# Patient Record
Sex: MALE | Race: WHITE | NOT HISPANIC OR LATINO | URBAN - METROPOLITAN AREA
[De-identification: names, ages, dates, MRNs, and addresses within clinical notes are randomized per-mention and may not be internally consistent; named-entity substitution may affect disease eponyms.]

---

## 2023-10-20 ENCOUNTER — OFFICE VISIT (OUTPATIENT)
Dept: FAMILY MEDICINE CLINIC | Facility: CLINIC | Age: 48
End: 2023-10-20

## 2023-10-20 VITALS
TEMPERATURE: 98.1 F | DIASTOLIC BLOOD PRESSURE: 80 MMHG | WEIGHT: 305.8 LBS | SYSTOLIC BLOOD PRESSURE: 138 MMHG | HEART RATE: 72 BPM | OXYGEN SATURATION: 99 % | HEIGHT: 72 IN | BODY MASS INDEX: 41.42 KG/M2

## 2023-10-20 DIAGNOSIS — Z13.220 LIPID SCREENING: ICD-10-CM

## 2023-10-20 DIAGNOSIS — Z76.89 ENCOUNTER TO ESTABLISH CARE: Primary | ICD-10-CM

## 2023-10-20 DIAGNOSIS — Z23 ENCOUNTER FOR IMMUNIZATION: ICD-10-CM

## 2023-10-20 DIAGNOSIS — Z13.228 SCREENING FOR METABOLIC DISORDER: ICD-10-CM

## 2023-10-20 DIAGNOSIS — Z12.11 COLON CANCER SCREENING: ICD-10-CM

## 2023-10-20 NOTE — PATIENT INSTRUCTIONS
Fasting labs as ordered  Cologuard as ordered  Schedule appointment for annual physical after labs completed

## 2023-10-20 NOTE — PROGRESS NOTES
[facility]        NAME: Shirley Webber is a 50 y.o. male  : 1975    MRN: [id number]  DATE: 2023  TIME: 3:23 PM    Assessment and Plan   Encounter to establish care [Z76.89]  1. Encounter to establish care        2. Lipid screening  Lipid Panel with Direct LDL reflex    Lipid Panel with Direct LDL reflex      3. Screening for metabolic disorder  Comprehensive metabolic panel    Comprehensive metabolic panel      4. Colon cancer screening  Cologuard      5. Encounter for immunization  TDAP VACCINE GREATER THAN OR EQUAL TO 8YO IM            Patient Instructions     Patient Instructions   Fasting labs as ordered  Cologuard as ordered  Schedule appointment for annual physical after labs completed        Chief Complaint     Chief Complaint   Patient presents with    Establish Care         History of Present Illness       Establish care as a new patient no other concerns today. Needs labs and cologuard will schedule for an PE  HX of gastric bypass  Wants dtap, declines flu           Review of Systems   Review of Systems   Constitutional:  Negative for activity change, diaphoresis, fatigue and fever. HENT:  Negative for congestion, facial swelling, hearing loss, rhinorrhea, sinus pressure, sinus pain, sneezing, sore throat and voice change. Eyes:  Negative for discharge and visual disturbance. Respiratory:  Negative for cough, choking, chest tightness, shortness of breath, wheezing and stridor. Cardiovascular:  Negative for chest pain, palpitations and leg swelling. Gastrointestinal:  Negative for abdominal distention, abdominal pain, constipation, diarrhea, nausea and vomiting. Endocrine: Negative for polydipsia, polyphagia and polyuria. Genitourinary:  Negative for difficulty urinating, dysuria, frequency and urgency. Musculoskeletal:  Negative for arthralgias, back pain, gait problem, joint swelling, myalgias, neck pain and neck stiffness.    Skin: Negative for color change, rash and wound. Neurological:  Negative for dizziness, syncope, speech difficulty, weakness, light-headedness and headaches. Hematological:  Negative for adenopathy. Does not bruise/bleed easily. Psychiatric/Behavioral:  Negative for agitation, behavioral problems, confusion, hallucinations, sleep disturbance and suicidal ideas. The patient is not nervous/anxious. Current Medications     No current outpatient medications on file. Current Allergies     Allergies as of 10/20/2023    (No Known Allergies)            The following portions of the patient's history were reviewed and updated as appropriate: allergies, current medications, past family history, past medical history, past social history, past surgical history and problem list.     History reviewed. No pertinent past medical history. History reviewed. No pertinent surgical history. Family History   Problem Relation Age of Onset    Coronary artery disease Mother     Arthritis Mother     Diabetes Maternal Grandmother     Glaucoma Maternal Grandmother          Medications have been verified. Objective   /80 (BP Location: Left arm, Patient Position: Sitting, Cuff Size: Standard)   Pulse 72   Temp 98.1 °F (36.7 °C) (Tympanic)   Ht 6' (1.829 m)   Wt (!) 139 kg (305 lb 12.8 oz)   SpO2 99%   BMI 41.47 kg/m²        Physical Exam     Physical Exam  Vitals and nursing note reviewed. Constitutional:       General: He is not in acute distress. Appearance: Normal appearance. He is well-developed. He is obese. He is not ill-appearing. HENT:      Head: Normocephalic and atraumatic. Right Ear: Tympanic membrane, ear canal and external ear normal. There is no impacted cerumen. Left Ear: Tympanic membrane, ear canal and external ear normal. There is no impacted cerumen. Nose: Nose normal.      Right Sinus: No maxillary sinus tenderness or frontal sinus tenderness.       Left Sinus: No maxillary sinus tenderness or frontal sinus tenderness. Mouth/Throat:      Mouth: Mucous membranes are moist.      Pharynx: Uvula midline. No oropharyngeal exudate or posterior oropharyngeal erythema. Eyes:      General:         Right eye: No discharge. Left eye: No discharge. Conjunctiva/sclera: Conjunctivae normal.      Pupils: Pupils are equal, round, and reactive to light. Neck:      Thyroid: No thyromegaly. Trachea: No tracheal deviation. Cardiovascular:      Rate and Rhythm: Normal rate and regular rhythm. Heart sounds: Normal heart sounds. No murmur heard. No friction rub. No gallop. Pulmonary:      Effort: Pulmonary effort is normal. No respiratory distress. Breath sounds: Normal breath sounds. No stridor. No wheezing, rhonchi or rales. Chest:      Chest wall: No tenderness. Abdominal:      General: Bowel sounds are normal. There is no distension. Palpations: Abdomen is soft. There is no mass. Tenderness: There is no abdominal tenderness. There is no guarding or rebound. Musculoskeletal:         General: No tenderness or deformity. Normal range of motion. Cervical back: Normal range of motion and neck supple. Right lower leg: No edema. Left lower leg: No edema. Lymphadenopathy:      Cervical: No cervical adenopathy. Skin:     General: Skin is warm and dry. Capillary Refill: Capillary refill takes less than 2 seconds. Coloration: Skin is not jaundiced. Findings: No bruising, erythema, lesion or rash. Neurological:      Mental Status: He is alert and oriented to person, place, and time. Cranial Nerves: No cranial nerve deficit. Motor: No weakness. Coordination: Coordination normal.      Gait: Gait normal.   Psychiatric:         Mood and Affect: Mood normal.         Speech: Speech normal.         Behavior: Behavior normal.         Thought Content:  Thought content normal.         Judgment: Judgment normal.       PHQ-2/9 Depression Screening    Little interest or pleasure in doing things: 0 - not at all  Feeling down, depressed, or hopeless: 0 - not at all  Trouble falling or staying asleep, or sleeping too much: 0 - not at all  Feeling tired or having little energy: 0 - not at all  Poor appetite or overeatin - not at all  Feeling bad about yourself - or that you are a failure or have let yourself or your family down: 0 - not at all  Trouble concentrating on things, such as reading the newspaper or watching television: 0 - not at all  Moving or speaking so slowly that other people could have noticed.  Or the opposite - being so fidgety or restless that you have been moving around a lot more than usual: 0 - not at all  Thoughts that you would be better off dead, or of hurting yourself in some way: 0 - not at all  PHQ-2 Score: 0  PHQ-2 Interpretation: Negative depression screen  PHQ-9 Score: 0   PHQ-9 Interpretation: No or Minimal depression

## 2023-11-15 LAB — COLOGUARD RESULT REPORTABLE: NEGATIVE

## 2023-11-17 ENCOUNTER — TELEPHONE (OUTPATIENT)
Dept: FAMILY MEDICINE CLINIC | Facility: CLINIC | Age: 48
End: 2023-11-17

## 2023-11-17 NOTE — TELEPHONE ENCOUNTER
----- Message from [address] [place] [address] sent at 11/17/2023  8:07 AM EST -----  Edgar is negative

## 2024-03-19 ENCOUNTER — OFFICE VISIT (OUTPATIENT)
Dept: FAMILY MEDICINE CLINIC | Facility: CLINIC | Age: 49
End: 2024-03-19
Payer: COMMERCIAL

## 2024-03-19 VITALS
OXYGEN SATURATION: 97 % | TEMPERATURE: 95.7 F | HEART RATE: 69 BPM | DIASTOLIC BLOOD PRESSURE: 88 MMHG | BODY MASS INDEX: 41.37 KG/M2 | WEIGHT: 305 LBS | SYSTOLIC BLOOD PRESSURE: 134 MMHG

## 2024-03-19 DIAGNOSIS — L23.7 POISON IVY DERMATITIS: Primary | ICD-10-CM

## 2024-03-19 PROCEDURE — 99213 OFFICE O/P EST LOW 20 MIN: CPT | Performed by: NURSE PRACTITIONER

## 2024-03-19 RX ORDER — PREDNISONE 20 MG/1
TABLET ORAL
Qty: 15 TABLET | Refills: 0 | Status: SHIPPED | OUTPATIENT
Start: 2024-03-19

## 2024-03-19 RX ORDER — TRIAMCINOLONE ACETONIDE 1 MG/G
CREAM TOPICAL 2 TIMES DAILY
Qty: 45 G | Refills: 1 | Status: SHIPPED | OUTPATIENT
Start: 2024-03-19

## 2024-03-19 NOTE — PATIENT INSTRUCTIONS
Prednisone as ordered  Apply triamcinolone cream to affected areas as ordered  Can take Benadryl to decrease itching

## 2024-03-19 NOTE — PROGRESS NOTES
[facility]        NAME: Tommy Richardson is a 48 y.o. male  : 1975    MRN: [id number]  DATE: 2024  TIME: 1:55 PM    Assessment and Plan   Poison ivy dermatitis [L23.7]  1. Poison ivy dermatitis  predniSONE 20 mg tablet    triamcinolone (KENALOG) 0.1 % cream            Patient Instructions     Patient Instructions   Prednisone as ordered  Apply triamcinolone cream to affected areas as ordered  Can take Benadryl to decrease itching        Chief Complaint     Chief Complaint   Patient presents with    Rash     Face, arms groin area, and legs         History of Present Illness       C/o poison ivy rash on face, arms, legs and groin. No wheezing. Using OTC cream with little relief.        Review of Systems   Review of Systems   Constitutional:  Negative for activity change.   Respiratory:  Negative for shortness of breath and wheezing.    Skin:  Positive for color change and rash.         Current Medications       Current Outpatient Medications:     predniSONE 20 mg tablet, Take one tablet twice a day x 5 days then take one tablet daily x 5 days., Disp: 15 tablet, Rfl: 0    triamcinolone (KENALOG) 0.1 % cream, Apply topically 2 (two) times a day, Disp: 45 g, Rfl: 1    Current Allergies     Allergies as of 2024    (No Known Allergies)            The following portions of the patient's history were reviewed and updated as appropriate: allergies, current medications, past family history, past medical history, past social history, past surgical history and problem list.     History reviewed. No pertinent past medical history.    History reviewed. No pertinent surgical history.    Family History   Problem Relation Age of Onset    Coronary artery disease Mother     Arthritis Mother     Diabetes Maternal Grandmother     Glaucoma Maternal Grandmother          Medications have been verified.        Objective   /88 (BP Location: Left arm, Patient Position: Sitting, Cuff Size:  Standard)   Pulse 69   Temp (!) 95.7 °F (35.4 °C) (Tympanic)   Wt (!) 138 kg (305 lb)   SpO2 97%   BMI 41.37 kg/m²        Physical Exam     Physical Exam  Vitals reviewed.   Constitutional:       General: He is not in acute distress.     Appearance: Normal appearance. He is not ill-appearing.   HENT:      Head: Normocephalic.   Cardiovascular:      Rate and Rhythm: Normal rate and regular rhythm.      Heart sounds: Normal heart sounds.   Pulmonary:      Effort: Pulmonary effort is normal. No respiratory distress.      Breath sounds: Normal breath sounds. No wheezing.   Skin:     General: Skin is warm.      Findings: Erythema and rash present.      Comments: Poison ivy rash noted on face, arm, legs, groin. Erythema and mild swelling right side of face.   Neurological:      Mental Status: He is alert and oriented to person, place, and time.   Psychiatric:         Mood and Affect: Mood normal.         Behavior: Behavior normal.

## 2024-06-05 ENCOUNTER — TELEMEDICINE (OUTPATIENT)
Dept: FAMILY MEDICINE CLINIC | Facility: CLINIC | Age: 49
End: 2024-06-05
Payer: COMMERCIAL

## 2024-06-05 DIAGNOSIS — L23.7 POISON IVY DERMATITIS: Primary | ICD-10-CM

## 2024-06-05 DIAGNOSIS — L03.115 CELLULITIS OF RIGHT LOWER EXTREMITY: ICD-10-CM

## 2024-06-05 PROCEDURE — 99213 OFFICE O/P EST LOW 20 MIN: CPT | Performed by: NURSE PRACTITIONER

## 2024-06-05 RX ORDER — PREDNISONE 20 MG/1
TABLET ORAL
Qty: 15 TABLET | Refills: 0 | Status: SHIPPED | OUTPATIENT
Start: 2024-06-05

## 2024-06-05 RX ORDER — SULFAMETHOXAZOLE AND TRIMETHOPRIM 800; 160 MG/1; MG/1
1 TABLET ORAL EVERY 12 HOURS SCHEDULED
Qty: 20 TABLET | Refills: 0 | Status: SHIPPED | OUTPATIENT
Start: 2024-06-05 | End: 2024-06-15

## 2024-06-05 NOTE — PATIENT INSTRUCTIONS
Prednisone as ordered for poison ivy.  Apply cool compresses and hydrocortisone cream to reduce itching.  Bactrim as ordered for cellulitis right lower extremity.  Keep are clean and dry.   If RLE symptoms worsen-increase swelling, pain, red streaking-go tho the ED for evaluation.

## 2024-06-05 NOTE — PROGRESS NOTES
Virtual Regular Visit    Verification of patient location:    Patient is located at Home in the following state in which I hold an active license [place]      Assessment/Plan:    Problem List Items Addressed This Visit    None  Visit Diagnoses       Poison ivy dermatitis    -  Primary    Relevant Medications    predniSONE 20 mg tablet    Cellulitis of right lower extremity        Relevant Medications    sulfamethoxazole-trimethoprim (BACTRIM DS) 800-160 mg per tablet          Prednisone as ordered for poison ivy.  Apply cool compresses and hydrocortisone cream to reduce itching.  Bactrim as ordered for cellulitis right lower extremity.  Keep are clean and dry.   If RLE symptoms worsen-increase swelling, pain, red streaking-go tho the ED for evaluation.          Reason for visit is   Chief Complaint   Patient presents with    Rash     Legs are red and swollen since monday. Both legs but right leg is worse.    Virtual Regular Visit          Encounter provider SUZETTE Roldan      Recent Visits  No visits were found meeting these conditions.  Showing recent visits within past 7 days and meeting all other requirements  Today's Visits  Date Type Provider Dept   06/05/24 Telemedicine SUZETTE Roldan [facility]   Showing today's visits and meeting all other requirements  Future Appointments  No visits were found meeting these conditions.  Showing future appointments within next 150 days and meeting all other requirements       The patient was identified by name and date of birth. Tommy Richardson was informed that this is a telemedicine visit and that the visit is being conducted through the V[employer] platform. He agrees to proceed..  My office door was closed. No one else was in the room.  He acknowledged consent and understanding of privacy and security of the video platform. The patient has agreed to participate and understands they can discontinue the visit at any time.    Patient is aware this  is a billable service.     Subjective  Tommy Richardson is a 48 y.o. male  .      C/o poison ivy to both legs. RLE is swollen, tender, red and warm to touch. No respiratory issues, no chest pain, no fever.         History reviewed. No pertinent past medical history.    History reviewed. No pertinent surgical history.    Current Outpatient Medications   Medication Sig Dispense Refill    predniSONE 20 mg tablet Take one tablet twice a day x 5 days then take one tablet daily x 5 days. 15 tablet 0    sulfamethoxazole-trimethoprim (BACTRIM DS) 800-160 mg per tablet Take 1 tablet by mouth every 12 (twelve) hours for 10 days 20 tablet 0    triamcinolone (KENALOG) 0.1 % cream Apply topically 2 (two) times a day (Patient not taking: Reported on 6/5/2024) 45 g 1     No current facility-administered medications for this visit.        No Known Allergies    Review of Systems   Constitutional:  Negative for activity change and fever.   Respiratory:  Negative for chest tightness, shortness of breath and wheezing.    Cardiovascular:  Positive for leg swelling. Negative for chest pain and palpitations.   Skin:  Positive for color change and pallor.   Neurological:  Negative for dizziness, weakness and headaches.       Video Exam    There were no vitals filed for this visit.    Physical Exam  Constitutional:       General: He is not in acute distress.     Appearance: Normal appearance. He is not ill-appearing.   HENT:      Head: Normocephalic.   Pulmonary:      Effort: Respiratory distress present.   Skin:     General: Skin is warm and dry.      Findings: Erythema and rash present.   Neurological:      Mental Status: He is alert.   Psychiatric:         Mood and Affect: Mood normal.         Behavior: Behavior normal.          Visit Time  Total Visit Duration:

## 2024-06-20 ENCOUNTER — TELEPHONE (OUTPATIENT)
Dept: FAMILY MEDICINE CLINIC | Facility: CLINIC | Age: 49
End: 2024-06-20

## 2024-06-20 NOTE — TELEPHONE ENCOUNTER
Called pt. Wanted to confirm appointment and insurance. Pt confirmed appointment. Wanted him to call insurance to see if we are in network has [facility] [place] . Told him to give us a call back.

## 2024-06-21 ENCOUNTER — OFFICE VISIT (OUTPATIENT)
Dept: FAMILY MEDICINE CLINIC | Facility: CLINIC | Age: 49
End: 2024-06-21
Payer: COMMERCIAL

## 2024-06-21 VITALS
HEART RATE: 88 BPM | OXYGEN SATURATION: 96 % | TEMPERATURE: 97.3 F | BODY MASS INDEX: 42.72 KG/M2 | DIASTOLIC BLOOD PRESSURE: 78 MMHG | WEIGHT: 315 LBS | SYSTOLIC BLOOD PRESSURE: 136 MMHG

## 2024-06-21 DIAGNOSIS — E66.01 CLASS 3 SEVERE OBESITY DUE TO EXCESS CALORIES WITHOUT SERIOUS COMORBIDITY WITH BODY MASS INDEX (BMI) OF 40.0 TO 44.9 IN ADULT (HCC): ICD-10-CM

## 2024-06-21 DIAGNOSIS — Z13.1 DIABETES MELLITUS SCREENING: ICD-10-CM

## 2024-06-21 DIAGNOSIS — Z00.00 ANNUAL PHYSICAL EXAM: Primary | ICD-10-CM

## 2024-06-21 PROCEDURE — 99396 PREV VISIT EST AGE 40-64: CPT | Performed by: NURSE PRACTITIONER

## 2024-06-21 NOTE — PROGRESS NOTES
Adult Annual Physical  Name: Tommy Richardson      : 1975      MRN: [id number]  Encounter Provider: SUZETTE Roldan  Encounter Date: 2024   Encounter department: [facility]    Assessment & Plan   1. Annual physical exam  2. Diabetes mellitus screening  -     Hemoglobin A1C; Future  -     Hemoglobin A1C  3. Class 3 severe obesity due to excess calories without serious comorbidity with body mass index (BMI) of 40.0 to 44.9 in adult (HCC)    Immunizations and preventive care screenings were discussed with patient today. Appropriate education was printed on patient's after visit summary.        Counseling:  Alcohol/drug use: discussed moderation in alcohol intake, the recommendations for healthy alcohol use, and avoidance of illicit drug use.  Dental Health: discussed importance of regular tooth brushing, flossing, and dental visits.  Injury prevention: discussed safety/seat belts, safety helmets, smoke detectors, carbon dioxide detectors, and smoking near bedding or upholstery.  Sexual health: discussed sexually transmitted diseases, partner selection, use of condoms, avoidance of unintended pregnancy, and contraceptive alternatives.  Exercise: the importance of regular exercise/physical activity was discussed. Recommend exercise 3-5 times per week for at least 30 minutes.       Depression Screening and Follow-up Plan: Patient was screened for depression during today's encounter. They screened negative with a PHQ-2 score of 0.        History of Present Illness     Adult Annual Physical:  Patient presents for annual physical. Here for annual physical. No concerns. Due labs-ordered previously. Patient was previously on medications for diabetes-condition improved after weight loss surgery and medications were stopped. .     Diet and Physical Activity:  - Diet/Nutrition: well balanced diet.  - Exercise: no formal exercise.    Depression Screening:  - PHQ-2 Score:  0    General Health:  - Sleep: sleeps well.  - Hearing: normal hearing bilateral ears.  - Vision: goes for regular eye exams.  - Dental: regular dental visits.    /GYN Health:    - History of STDs: no     Health:  - History of STDs: no.     Advanced Care Planning:  - Has an advanced directive?: no    - Has a durable medical POA?: no    - ACP document given to patient?: yes      Review of Systems   Constitutional:  Negative for activity change, diaphoresis, fatigue and fever.   HENT:  Negative for congestion, facial swelling, hearing loss, rhinorrhea, sinus pressure, sinus pain, sneezing, sore throat and voice change.    Eyes:  Negative for discharge and visual disturbance.   Respiratory:  Negative for cough, choking, chest tightness, shortness of breath, wheezing and stridor.    Cardiovascular:  Negative for chest pain, palpitations and leg swelling.   Gastrointestinal:  Negative for abdominal distention, abdominal pain, constipation, diarrhea, nausea and vomiting.   Endocrine: Negative for polydipsia, polyphagia and polyuria.   Genitourinary:  Negative for difficulty urinating, dysuria, frequency and urgency.   Musculoskeletal:  Negative for arthralgias, back pain, gait problem, joint swelling, myalgias, neck pain and neck stiffness.   Skin:  Negative for color change, rash and wound.   Neurological:  Negative for dizziness, syncope, speech difficulty, weakness, light-headedness and headaches.   Hematological:  Negative for adenopathy. Does not bruise/bleed easily.   Psychiatric/Behavioral:  Negative for agitation, behavioral problems, confusion, hallucinations, sleep disturbance and suicidal ideas. The patient is not nervous/anxious.          Objective     /78 (BP Location: Left arm, Patient Position: Sitting, Cuff Size: Standard)   Pulse 88   Temp (!) 97.3 °F (36.3 °C) (Tympanic)   Wt (!) 143 kg (315 lb)   SpO2 96%   BMI 42.72 kg/m²     Physical Exam  Vitals and nursing note reviewed.    Constitutional:       General: He is not in acute distress.     Appearance: Normal appearance. He is well-developed. He is obese. He is not ill-appearing, toxic-appearing or diaphoretic.   HENT:      Head: Normocephalic and atraumatic.      Right Ear: Tympanic membrane, ear canal and external ear normal. There is no impacted cerumen.      Left Ear: Tympanic membrane, ear canal and external ear normal. There is no impacted cerumen.      Nose: Nose normal. No congestion or rhinorrhea.      Mouth/Throat:      Mouth: Mucous membranes are moist.      Pharynx: Oropharynx is clear. No oropharyngeal exudate or posterior oropharyngeal erythema.   Eyes:      General:         Right eye: No discharge.         Left eye: No discharge.      Extraocular Movements: Extraocular movements intact.      Conjunctiva/sclera: Conjunctivae normal.      Pupils: Pupils are equal, round, and reactive to light.   Neck:      Vascular: No carotid bruit.   Cardiovascular:      Rate and Rhythm: Normal rate and regular rhythm.      Heart sounds: Normal heart sounds. No murmur heard.     No friction rub. No gallop.   Pulmonary:      Effort: Pulmonary effort is normal. No respiratory distress.      Breath sounds: Normal breath sounds. No stridor. No wheezing, rhonchi or rales.   Chest:      Chest wall: No tenderness.   Abdominal:      General: Bowel sounds are normal. There is no distension.      Palpations: Abdomen is soft. There is no mass.      Tenderness: There is no abdominal tenderness. There is no right CVA tenderness, left CVA tenderness, guarding or rebound.      Hernia: No hernia is present.   Musculoskeletal:         General: No swelling, tenderness, deformity or signs of injury.      Cervical back: Normal range of motion and neck supple. No rigidity or tenderness.      Right lower leg: No edema.      Left lower leg: No edema.   Lymphadenopathy:      Cervical: No cervical adenopathy.   Skin:     General: Skin is warm and dry.       Capillary Refill: Capillary refill takes less than 2 seconds.      Coloration: Skin is not jaundiced or pale.      Findings: No bruising, erythema, lesion or rash.   Neurological:      General: No focal deficit present.      Mental Status: He is alert and oriented to person, place, and time.      Cranial Nerves: No cranial nerve deficit.      Sensory: No sensory deficit.      Motor: No weakness.      Coordination: Coordination normal.      Gait: Gait normal.      Deep Tendon Reflexes: Reflexes normal.   Psychiatric:         Mood and Affect: Mood normal.         Behavior: Behavior normal.         Thought Content: Thought content normal.         Judgment: Judgment normal.

## 2024-06-21 NOTE — PATIENT INSTRUCTIONS
Wellness Visit for Adults   AMBULATORY CARE:   A wellness visit  is when you see your healthcare provider to get screened for health problems. Your healthcare provider will also give you advice on how to stay healthy. Write down your questions so you remember to ask them. Ask your healthcare provider how often you should have a wellness visit.  What happens at a wellness visit:  Your healthcare provider will ask about your health, and your family history of health problems. This includes high blood pressure, heart disease, and cancer. He or she will ask if you have symptoms that concern you, if you smoke, and about your mood. You may also be asked about your intake of medicines, supplements, food, and alcohol. Any of the following may be done:  Your weight  will be checked. Your height may also be checked so your body mass index (BMI) can be calculated. Your BMI shows if you are at a healthy weight.    Your blood pressure  and heart rate will be checked. Your temperature may also be checked.    Blood and urine tests  may be done. Blood tests may be done to check your cholesterol levels. Abnormal cholesterol levels increase your risk for heart disease and stroke. You may also need a blood or urine test to check for diabetes if you are at increased risk. Urine tests may be done to look for signs of an infection or kidney disease.    A physical exam  includes checking your heartbeat and lungs with a stethoscope. Your healthcare provider may also check your skin to look for sun damage.    Screening tests  may be recommended. A screening test is done to check for diseases that may not cause symptoms. The screening tests you may need depend on your age, gender, family history, and lifestyle habits. For example, colorectal screening may be recommended if you are 50 years old or older.    Screening tests you need if you are a woman:   A Pap smear  is used to screen for cervical cancer. Pap smears are usually done every  3 to 5 years depending on your age. You may need them more often if you have had abnormal Pap smear test results in the past. Ask your healthcare provider how often you should have a Pap smear.    A mammogram  is an x-ray of your breasts to screen for breast cancer. Experts recommend mammograms every 2 years starting at age 50 years. You may need a mammogram at age 49 years or younger if you have an increased risk for breast cancer. Talk to your healthcare provider about when you should start having mammograms and how often you need them.    Vaccines you may need:   Get an influenza vaccine  every year. The influenza vaccine protects you from the flu. Several types of viruses cause the flu. The viruses change over time, so new vaccines are made each year.    Get a tetanus-diphtheria (Td) booster vaccine  every 10 years. This vaccine protects you against tetanus and diphtheria. Tetanus is a severe infection that may cause painful muscle spasms and lockjaw. Diphtheria is a severe bacterial infection that causes a thick covering in the back of your mouth and throat.    Get a human papillomavirus (HPV) vaccine  if you are female and aged 19 to 26 or male 19 to 21 and never received it. This vaccine protects you from HPV infection. HPV is the most common infection spread by sexual contact. HPV may also cause vaginal, penile, and anal cancers.    Get a pneumococcal vaccine  if you are aged 65 years or older. The pneumococcal vaccine is an injection given to protect you from pneumococcal disease. Pneumococcal disease is an infection caused by pneumococcal bacteria. The infection may cause pneumonia, meningitis, or an ear infection.    Get a shingles vaccine  if you are 60 or older, even if you have had shingles before. The shingles vaccine is an injection to protect you from the varicella-zoster virus. This is the same virus that causes chickenpox. Shingles is a painful rash that develops in people who had chickenpox or  have been exposed to the virus.    How to eat healthy:  My Plate is a model for planning healthy meals. It shows the types and amounts of foods that should go on your plate. Fruits and vegetables make up about half of your plate, and grains and protein make up the other half. A serving of dairy is included on the side of your plate. The amount of calories and serving sizes you need depends on your age, gender, weight, and height. Examples of healthy foods are listed below:  Eat a variety of vegetables  such as dark green, red, and orange vegetables. You can also include canned vegetables low in sodium (salt) and frozen vegetables without added butter or sauces.    Eat a variety of fresh fruits , canned fruit in 100% juice, frozen fruit, and dried fruit.    Include whole grains.  At least half of the grains you eat should be whole grains. Examples include whole-wheat bread, wheat pasta, brown rice, and whole-grain cereals such as oatmeal.    Eat a variety of protein foods such as seafood (fish and shellfish), lean meat, and poultry without skin (turkey and chicken). Examples of lean meats include pork leg, shoulder, or tenderloin, and beef round, sirloin, tenderloin, and extra lean ground beef. Other protein foods include eggs and egg substitutes, beans, peas, soy products, nuts, and seeds.    Choose low-fat dairy products such as skim or 1% milk or low-fat yogurt, cheese, and cottage cheese.    Limit unhealthy fats  such as butter, hard margarine, and shortening.       Exercise:  Exercise at least 30 minutes per day on most days of the week. Some examples of exercise include walking, biking, dancing, and swimming. You can also fit in more physical activity by taking the stairs instead of the elevator or parking farther away from stores. Include muscle strengthening activities 2 days each week. Regular exercise provides many health benefits. It helps you manage your weight, and decreases your risk for type 2  diabetes, heart disease, stroke, and high blood pressure. Exercise can also help improve your mood. Ask your healthcare provider about the best exercise plan for you.       General health and safety guidelines:   Do not smoke.  Nicotine and other chemicals in cigarettes and cigars can cause lung damage. Ask your healthcare provider for information if you currently smoke and need help to quit. E-cigarettes or smokeless tobacco still contain nicotine. Talk to your healthcare provider before you use these products.    Limit alcohol.  A drink of alcohol is 12 ounces of beer, 5 ounces of wine, or 1½ ounces of liquor.    Lose weight, if needed.  Being overweight increases your risk of certain health conditions. These include heart disease, high blood pressure, type 2 diabetes, and certain types of cancer.    Protect your skin.  Do not sunbathe or use tanning beds. Use sunscreen with a SPF 15 or higher. Apply sunscreen at least 15 minutes before you go outside. Reapply sunscreen every 2 hours. Wear protective clothing, hats, and sunglasses when you are outside.    Drive safely.  Always wear your seatbelt. Make sure everyone in your car wears a seatbelt. A seatbelt can save your life if you are in an accident. Do not use your cell phone when you are driving. This could distract you and cause an accident. Pull over if you need to make a call or send a text message.    Practice safe sex.  Use latex condoms if are sexually active and have more than one partner. Your healthcare provider may recommend screening tests for sexually transmitted infections (STIs).    Wear helmets, lifejackets, and protective gear.  Always wear a helmet when you ride a bike or motorcycle, go skiing, or play sports that could cause a head injury. Wear protective equipment when you play sports. Wear a lifejacket when you are on a boat or doing water sports.    © Copyright [employer] 2023 Information is for End User's use only and may not be sold,  redistributed or otherwise used for commercial purposes.  The above information is an  only. It is not intended as medical advice for individual conditions or treatments. Talk to your doctor, nurse or pharmacist before following any medical regimen to see if it is safe and effective for you.

## 2025-01-13 ENCOUNTER — OFFICE VISIT (OUTPATIENT)
Dept: FAMILY MEDICINE CLINIC | Facility: CLINIC | Age: 50
End: 2025-01-13
Payer: COMMERCIAL

## 2025-01-13 VITALS — OXYGEN SATURATION: 95 % | HEART RATE: 71 BPM | BODY MASS INDEX: 42.72 KG/M2 | HEIGHT: 72 IN

## 2025-01-13 DIAGNOSIS — J20.9 ACUTE BRONCHITIS, UNSPECIFIED ORGANISM: Primary | ICD-10-CM

## 2025-01-13 LAB
SARS-COV-2 AG UPPER RESP QL IA: NEGATIVE
SL AMB POCT RAPID FLU A: NEGATIVE
SL AMB POCT RAPID FLU B: NEGATIVE
VALID CONTROL: NORMAL

## 2025-01-13 PROCEDURE — 99213 OFFICE O/P EST LOW 20 MIN: CPT | Performed by: NURSE PRACTITIONER

## 2025-01-13 PROCEDURE — 87811 SARS-COV-2 COVID19 W/OPTIC: CPT | Performed by: NURSE PRACTITIONER

## 2025-01-13 PROCEDURE — 87804 INFLUENZA ASSAY W/OPTIC: CPT | Performed by: NURSE PRACTITIONER

## 2025-01-13 RX ORDER — AZITHROMYCIN 250 MG/1
TABLET, FILM COATED ORAL
Qty: 6 TABLET | Refills: 0 | Status: SHIPPED | OUTPATIENT
Start: 2025-01-13 | End: 2025-01-18

## 2025-01-13 NOTE — PROGRESS NOTES
Name: Tommy Richardson      : 1975      MRN: [id number]  Encounter Provider: SUZETTE Roldan  Encounter Date: 2025   Encounter department: [facility]  :  Assessment & Plan  Acute bronchitis, unspecified organism    Orders:    POCT Rapid Covid Ag    POCT rapid flu A and B    azithromycin (Zithromax) 250 mg tablet; Take 2 tablets (500 mg total) by mouth daily for 1 day, THEN 1 tablet (250 mg total) daily for 4 days.    Discussed viral vs bacterial infection  Flu and Covid are negative  RX as ordered  Continue OTC cough/cold medications as directed  Call or return for problems/concerns       History of Present Illness     C/o chest congestion, cough, fever started Thursday. Taking otc cold medications with mild relief.      Review of Systems   Constitutional:  Positive for fever. Negative for activity change, chills and fatigue.   HENT:  Positive for congestion, postnasal drip, rhinorrhea and sore throat. Negative for ear pain and sinus pressure.    Eyes:  Negative for pain, discharge and redness.   Respiratory:  Positive for cough. Negative for shortness of breath and wheezing.    Cardiovascular:  Negative for chest pain.   Gastrointestinal:  Negative for constipation, diarrhea, nausea and vomiting.   Musculoskeletal:  Negative for myalgias.   Skin:  Negative for rash.   Neurological:  Negative for dizziness and headaches.       Objective   Pulse 71   Ht 6' (1.829 m)   SpO2 95%   BMI 42.72 kg/m²      Physical Exam  Vitals and nursing note reviewed.   Constitutional:       General: He is not in acute distress.     Appearance: He is ill-appearing.   HENT:      Head: Normocephalic.      Right Ear: Tympanic membrane, ear canal and external ear normal. There is no impacted cerumen.      Left Ear: Tympanic membrane, ear canal and external ear normal. There is no impacted cerumen.      Nose: Rhinorrhea present.      Mouth/Throat:      Mouth: Mucous membranes are moist.       Pharynx: Oropharynx is clear. Posterior oropharyngeal erythema present. No oropharyngeal exudate.   Eyes:      Extraocular Movements: Extraocular movements intact.      Conjunctiva/sclera: Conjunctivae normal.   Cardiovascular:      Rate and Rhythm: Normal rate and regular rhythm.      Heart sounds: Normal heart sounds.   Pulmonary:      Effort: Pulmonary effort is normal. No respiratory distress.      Breath sounds: Rhonchi present.   Musculoskeletal:         General: Normal range of motion.   Skin:     General: Skin is warm and dry.      Coloration: Skin is not pale.      Findings: No erythema.   Neurological:      Mental Status: He is alert and oriented to person, place, and time.   Psychiatric:         Mood and Affect: Mood normal.         Behavior: Behavior normal.

## 2025-01-13 NOTE — LETTER
January 13, 2025     Patient: Tommy Richardson  YOB: 1975  Date of Visit: 1/13/2025      To Whom it May Concern:    Tommy Richardson is under my professional care. Tommy was seen in my office on 1/13/2025. Tommy may return to work on 1/15/25 .    If you have any questions or concerns, please don't hesitate to call.         Sincerely,          SUZETTE Roldan        CC: No Recipients

## 2025-01-13 NOTE — PATIENT INSTRUCTIONS
Discussed viral vs bacterial infection  Flu and Covid are negative  RX as ordered  Continue OTC cough/cold medications as directed  Call or return for problems/concerns

## 2025-05-20 ENCOUNTER — OFFICE VISIT (OUTPATIENT)
Dept: FAMILY MEDICINE CLINIC | Facility: CLINIC | Age: 50
End: 2025-05-20
Payer: COMMERCIAL

## 2025-05-20 VITALS
SYSTOLIC BLOOD PRESSURE: 138 MMHG | TEMPERATURE: 97.2 F | BODY MASS INDEX: 44.08 KG/M2 | DIASTOLIC BLOOD PRESSURE: 70 MMHG | OXYGEN SATURATION: 94 % | HEART RATE: 105 BPM | WEIGHT: 315 LBS

## 2025-05-20 DIAGNOSIS — J01.00 ACUTE NON-RECURRENT MAXILLARY SINUSITIS: Primary | ICD-10-CM

## 2025-05-20 PROCEDURE — 99213 OFFICE O/P EST LOW 20 MIN: CPT | Performed by: NURSE PRACTITIONER

## 2025-05-20 RX ORDER — AZITHROMYCIN 250 MG/1
TABLET, FILM COATED ORAL
Qty: 6 TABLET | Refills: 0 | Status: SHIPPED | OUTPATIENT
Start: 2025-05-20 | End: 2025-05-25

## 2025-05-20 NOTE — LETTER
May 20, 2025     Patient: Tommy Richardson  YOB: 1975  Date of Visit: 5/20/2025      To Whom it May Concern:    Tommy Richardson is under my professional care. Tommy was seen in my office on 5/20/2025. Please excuse from work 5/19/25-5/20/25. Tommy may return to work on 5/21/25.    If you have any questions or concerns, please don't hesitate to call.         Sincerely,          SUZETTE Roldan        CC: No Recipients

## 2025-05-20 NOTE — PROGRESS NOTES
Name: Tommy Richardson      : 1975      MRN: [id number]  Encounter Provider: SUZETTE Roldan  Encounter Date: 2025   Encounter department: [facility]  :  Assessment & Plan  Acute non-recurrent maxillary sinusitis  Discussed allergic vs viral vs bacterial infection  Zithromax as directed  Continue OTC allergy/cough/cold medications as directed  Call or return for problems/concerns    Orders:    azithromycin (Zithromax) 250 mg tablet; Take 2 tablets (500 mg total) by mouth daily for 1 day, THEN 1 tablet (250 mg total) daily for 4 days.           History of Present Illness   Sinus pressure, congestion, headache-for a few days. Taking otc cold and allergy medications with mild relief.    Fever - 9 weeks to 74 years   This is a new problem. The current episode started in the past 7 days. The problem occurs 2 to 4 times per day. The problem has been unchanged. The maximum temperature noted was 100 to 100.9 F. The temperature was taken using an oral thermometer. Associated symptoms include congestion, coughing, diarrhea, headaches and sleepiness. Pertinent negatives include no abdominal pain, chest pain, nausea, rash, sore throat, urinary pain, vomiting or wheezing. Treatments tried: allergy and cold medications.   Risk factors: sick contacts    Risk factors: no contaminated food, no contaminated water, no hx of cancer, no immunosuppression, no occupational exposure, no recent sickness and no recent travel      Review of Systems   Constitutional:  Positive for fever.   HENT:  Positive for congestion, postnasal drip and sinus pressure. Negative for sore throat.    Respiratory:  Positive for cough. Negative for wheezing.    Cardiovascular:  Negative for chest pain.   Gastrointestinal:  Positive for diarrhea. Negative for abdominal pain, nausea and vomiting.   Genitourinary:  Negative for dysuria.   Skin:  Negative for rash.   Neurological:  Positive for headaches.        Objective   /70 (BP Location: Left arm, Patient Position: Sitting, Cuff Size: Standard)   Pulse 105   Temp (!) 97.2 °F (36.2 °C) (Tympanic)   Wt (!) 147 kg (325 lb)   SpO2 94%   BMI 44.08 kg/m²      Physical Exam  Vitals and nursing note reviewed.   Constitutional:       General: He is not in acute distress.     Appearance: He is well-developed. He is ill-appearing. He is not diaphoretic.   HENT:      Head: Normocephalic and atraumatic.      Right Ear: Tympanic membrane, ear canal and external ear normal. There is no impacted cerumen.      Left Ear: Tympanic membrane, ear canal and external ear normal. There is no impacted cerumen.      Nose: Congestion and rhinorrhea present.      Right Sinus: Maxillary sinus tenderness present.      Left Sinus: Maxillary sinus tenderness present.      Mouth/Throat:      Mouth: Mucous membranes are moist.      Pharynx: Oropharynx is clear. Uvula midline. No oropharyngeal exudate or posterior oropharyngeal erythema.     Eyes:      Extraocular Movements: Extraocular movements intact.      Conjunctiva/sclera: Conjunctivae normal.       Cardiovascular:      Rate and Rhythm: Normal rate and regular rhythm.      Heart sounds: Normal heart sounds. No murmur heard.     No friction rub. No gallop.   Pulmonary:      Effort: Pulmonary effort is normal. No respiratory distress.      Breath sounds: Normal breath sounds. No stridor. No wheezing, rhonchi or rales.   Chest:      Chest wall: No tenderness.   Abdominal:      General: There is no distension.      Tenderness: There is no abdominal tenderness.     Musculoskeletal:         General: Normal range of motion.      Cervical back: Normal range of motion and neck supple. No tenderness.   Lymphadenopathy:      Cervical: No cervical adenopathy.     Skin:     General: Skin is warm and dry.      Coloration: Skin is not pale.      Findings: No erythema.     Neurological:      Mental Status: He is alert and oriented to person,  place, and time.     Psychiatric:         Mood and Affect: Mood normal.         Behavior: Behavior normal.         Thought Content: Thought content normal.         Judgment: Judgment normal.

## 2025-08-19 ENCOUNTER — TELEPHONE (OUTPATIENT)
Dept: FAMILY MEDICINE CLINIC | Facility: CLINIC | Age: 50
End: 2025-08-19

## 2025-08-19 ENCOUNTER — OFFICE VISIT (OUTPATIENT)
Dept: FAMILY MEDICINE CLINIC | Facility: CLINIC | Age: 50
End: 2025-08-19
Payer: COMMERCIAL

## 2025-08-19 VITALS
HEART RATE: 67 BPM | WEIGHT: 315 LBS | TEMPERATURE: 97.3 F | HEIGHT: 72 IN | OXYGEN SATURATION: 95 % | DIASTOLIC BLOOD PRESSURE: 82 MMHG | SYSTOLIC BLOOD PRESSURE: 130 MMHG | BODY MASS INDEX: 42.66 KG/M2

## 2025-08-19 DIAGNOSIS — Z12.5 ENCOUNTER FOR SCREENING PROSTATE SPECIFIC ANTIGEN (PSA) MEASUREMENT: ICD-10-CM

## 2025-08-19 DIAGNOSIS — Z13.1 DIABETES MELLITUS SCREENING: ICD-10-CM

## 2025-08-19 DIAGNOSIS — Z00.00 ANNUAL PHYSICAL EXAM: Primary | ICD-10-CM

## 2025-08-19 PROCEDURE — 99396 PREV VISIT EST AGE 40-64: CPT | Performed by: NURSE PRACTITIONER
